# Patient Record
Sex: MALE | Race: WHITE | NOT HISPANIC OR LATINO | Employment: FULL TIME | URBAN - METROPOLITAN AREA
[De-identification: names, ages, dates, MRNs, and addresses within clinical notes are randomized per-mention and may not be internally consistent; named-entity substitution may affect disease eponyms.]

---

## 2023-11-02 ENCOUNTER — OFFICE VISIT (OUTPATIENT)
Dept: URGENT CARE | Facility: CLINIC | Age: 63
End: 2023-11-02
Payer: COMMERCIAL

## 2023-11-02 VITALS
WEIGHT: 190 LBS | TEMPERATURE: 97.8 F | OXYGEN SATURATION: 96 % | HEART RATE: 82 BPM | SYSTOLIC BLOOD PRESSURE: 120 MMHG | DIASTOLIC BLOOD PRESSURE: 80 MMHG | RESPIRATION RATE: 16 BRPM

## 2023-11-02 DIAGNOSIS — J04.0 LARYNGITIS: Primary | ICD-10-CM

## 2023-11-02 PROCEDURE — 1036F TOBACCO NON-USER: CPT | Performed by: PHYSICIAN ASSISTANT

## 2023-11-02 PROCEDURE — 99203 OFFICE O/P NEW LOW 30 MIN: CPT | Performed by: PHYSICIAN ASSISTANT

## 2023-11-02 ASSESSMENT — PAIN SCALES - GENERAL: PAINLEVEL: 0-NO PAIN

## 2023-11-02 NOTE — LETTER
November 2, 2023     Patient: Richy Recinos   YOB: 1960   Date of Visit: 11/2/2023       To Whom It May Concern:    It is my medical opinion that Richy Recinos may return to work on 11/04/2023 .    If you have any questions or concerns, please don't hesitate to call.         Sincerely,        Terry Fierro PA-C    CC: No Recipients

## 2023-11-02 NOTE — PROGRESS NOTES
Subjective   Patient ID: Richy Recinos is a 63 y.o. female who presents for complaints of loss of voice.  He denies any throat pain denies any fevers or chills denies any nausea vomiting diarrhea or abdominal pain.  He does endorse some sneezing and coughing yesterday.  Denies any significant runny nose or postnasal drip        Review of Systems   All other systems reviewed and are negative.      Objective   /80   Pulse 82   Temp 36.6 °C (97.8 °F)   Resp 16   Wt 86.2 kg (190 lb)   SpO2 96%   Physical Exam  Constitutional:       General: She is not in acute distress.     Appearance: Normal appearance. She is not ill-appearing, toxic-appearing or diaphoretic.   HENT:      Head: Normocephalic and atraumatic.      Right Ear: Tympanic membrane and ear canal normal.      Left Ear: Tympanic membrane and ear canal normal.      Nose: No congestion or rhinorrhea.      Mouth/Throat:      Mouth: Mucous membranes are moist.      Pharynx: Oropharynx is clear. No oropharyngeal exudate or posterior oropharyngeal erythema.   Eyes:      Conjunctiva/sclera: Conjunctivae normal.   Cardiovascular:      Rate and Rhythm: Normal rate and regular rhythm.      Heart sounds: No murmur heard.  Pulmonary:      Effort: Pulmonary effort is normal. No respiratory distress.      Breath sounds: Normal breath sounds. No wheezing.   Musculoskeletal:         General: No swelling, tenderness, deformity or signs of injury. Normal range of motion.      Cervical back: Normal range of motion and neck supple.   Skin:     General: Skin is warm and dry.      Findings: No erythema or rash.   Neurological:      General: No focal deficit present.      Mental Status: She is alert and oriented to person, place, and time.      Gait: Gait normal.         Assessment/Plan   Problem List Items Addressed This Visit       Laryngitis - Primary      -Discussed with patient that laryngitis almost invariably a viral illness, self-limiting  -Mainstay of  treatment is vocal rest  -Patient may also find cough drops or tea and honey helpful for soothing the throat, vocal cords  -Patient without any pain, erythema, tonsillar exudate or hypertrophy I have low suspicion of strep given absence of any throat pain

## 2023-11-02 NOTE — PATIENT INSTRUCTIONS
Assessment/Plan   Problem List Items Addressed This Visit       Laryngitis - Primary      -Discussed with patient that laryngitis almost invariably a viral illness, self-limiting  -Mainstay of treatment is vocal rest  -Patient may also find cough drops or tea and honey helpful for soothing the throat, vocal cords  -Patient without any pain, erythema, tonsillar exudate or hypertrophy I have low suspicion of strep given absence of any throat pain

## 2024-05-11 ENCOUNTER — OFFICE VISIT (OUTPATIENT)
Dept: URGENT CARE | Facility: CLINIC | Age: 64
End: 2024-05-11
Payer: COMMERCIAL

## 2024-05-11 VITALS
SYSTOLIC BLOOD PRESSURE: 137 MMHG | DIASTOLIC BLOOD PRESSURE: 78 MMHG | TEMPERATURE: 98.5 F | HEART RATE: 83 BPM | WEIGHT: 195 LBS | RESPIRATION RATE: 16 BRPM | OXYGEN SATURATION: 96 %

## 2024-05-11 DIAGNOSIS — J01.90 ACUTE SINUSITIS WITH SYMPTOMS > 10 DAYS: Primary | ICD-10-CM

## 2024-05-11 PROCEDURE — 99213 OFFICE O/P EST LOW 20 MIN: CPT | Performed by: PHYSICIAN ASSISTANT

## 2024-05-11 RX ORDER — LORATADINE PSEUDOEPHEDRINE SULFATE 10; 240 MG/1; MG/1
1 TABLET, EXTENDED RELEASE ORAL DAILY
Qty: 14 TABLET | Refills: 0 | Status: SHIPPED | OUTPATIENT
Start: 2024-05-11 | End: 2024-05-25

## 2024-05-11 RX ORDER — DOXYCYCLINE 100 MG/1
100 CAPSULE ORAL 2 TIMES DAILY
Qty: 20 CAPSULE | Refills: 0 | Status: SHIPPED | OUTPATIENT
Start: 2024-05-11 | End: 2024-05-21

## 2024-05-11 ASSESSMENT — PAIN SCALES - GENERAL: PAINLEVEL: 6

## 2024-05-11 NOTE — PATIENT INSTRUCTIONS
Assessment/Plan   Problem List Items Addressed This Visit       Acute sinusitis with symptoms > 10 days - Primary    Relevant Medications    loratadine-pseudoephedrine (Claritin-D 24 Hour)  mg 24 hr tablet    doxycycline (Vibramycin) 100 mg capsule         Patient here with 2 weeks now of sinusitis symptoms.  Treating with doxycycline and recommending Claritin-D as well.  Further recommendations of nasal saline irrigation and long steamy showers to help break up the congestion

## 2024-05-11 NOTE — PROGRESS NOTES
Subjective   Patient ID: Richy Recinos is a 64 y.o. male who presents for Nasal Congestion (Head congestion x 2 weeks ).  Patient notes pain around his eyes and nasal congestion has been worsening over the course the last 2 weeks.  No fevers or chills any nausea vomiting diarrhea or abdominal pain no chest pain or shortness of breath        The remainder of the systems were reviewed and are negative unless noted above      Objective   /78   Pulse 83   Temp 36.9 °C (98.5 °F)   Resp 16   Wt 88.5 kg (195 lb)   SpO2 96%   Physical Exam  Vitals reviewed.   Constitutional:       General: He is not in acute distress.     Appearance: Normal appearance. He is not toxic-appearing.   HENT:      Head: Normocephalic.      Right Ear: Tympanic membrane and ear canal normal. No tenderness. No mastoid tenderness.      Left Ear: Tympanic membrane and ear canal normal. No tenderness. No mastoid tenderness.      Nose: Congestion and rhinorrhea present.      Comments: Tenderness to palpation over the frontal as well as the maxillary sinuses bilaterally     Mouth/Throat:      Mouth: Mucous membranes are moist.      Pharynx: Oropharynx is clear. Posterior oropharyngeal erythema present.   Eyes:      Conjunctiva/sclera: Conjunctivae normal.      Pupils: Pupils are equal, round, and reactive to light.   Cardiovascular:      Rate and Rhythm: Normal rate and regular rhythm.      Heart sounds: No murmur heard.  Pulmonary:      Effort: No respiratory distress.      Breath sounds: No stridor. No wheezing, rhonchi or rales.   Chest:      Chest wall: No tenderness.   Abdominal:      Tenderness: There is no abdominal tenderness. There is no guarding.   Musculoskeletal:         General: Normal range of motion.   Skin:     General: Skin is warm and dry.      Capillary Refill: Capillary refill takes less than 2 seconds.      Findings: No erythema.   Neurological:      General: No focal deficit present.      Mental Status: He is alert.          Assessment/Plan   Problem List Items Addressed This Visit       Acute sinusitis with symptoms > 10 days - Primary    Relevant Medications    loratadine-pseudoephedrine (Claritin-D 24 Hour)  mg 24 hr tablet    doxycycline (Vibramycin) 100 mg capsule         Patient here with 2 weeks now of sinusitis symptoms.  Treating with doxycycline and recommending Claritin-D as well.  Further recommendations of nasal saline irrigation and long steamy showers to help break up the congestion